# Patient Record
Sex: FEMALE | Race: BLACK OR AFRICAN AMERICAN | NOT HISPANIC OR LATINO | Employment: OTHER | URBAN - METROPOLITAN AREA
[De-identification: names, ages, dates, MRNs, and addresses within clinical notes are randomized per-mention and may not be internally consistent; named-entity substitution may affect disease eponyms.]

---

## 2018-04-23 ENCOUNTER — OFFICE VISIT (OUTPATIENT)
Dept: NEUROSURGERY | Facility: CLINIC | Age: 53
End: 2018-04-23
Payer: MEDICARE

## 2018-04-23 VITALS — WEIGHT: 285.69 LBS

## 2018-04-23 DIAGNOSIS — M47.816 FACET ARTHROPATHY, LUMBAR: Primary | ICD-10-CM

## 2018-04-23 DIAGNOSIS — M54.9 BACK PAIN, UNSPECIFIED BACK LOCATION, UNSPECIFIED BACK PAIN LATERALITY, UNSPECIFIED CHRONICITY: ICD-10-CM

## 2018-04-23 DIAGNOSIS — Z71.3 WEIGHT LOSS COUNSELING, ENCOUNTER FOR: ICD-10-CM

## 2018-04-23 PROCEDURE — 99204 OFFICE O/P NEW MOD 45 MIN: CPT | Mod: S$PBB,,, | Performed by: PHYSICIAN ASSISTANT

## 2018-04-23 PROCEDURE — 99203 OFFICE O/P NEW LOW 30 MIN: CPT | Mod: PBBFAC | Performed by: PHYSICIAN ASSISTANT

## 2018-04-23 PROCEDURE — 99999 PR PBB SHADOW E&M-NEW PATIENT-LVL III: CPT | Mod: PBBFAC,,, | Performed by: PHYSICIAN ASSISTANT

## 2018-04-23 RX ORDER — GABAPENTIN 600 MG/1
TABLET ORAL
COMMUNITY
Start: 2018-04-11

## 2018-04-23 RX ORDER — ESOMEPRAZOLE MAGNESIUM 40 MG/1
CAPSULE, DELAYED RELEASE ORAL
COMMUNITY
Start: 2018-01-19

## 2018-04-23 RX ORDER — METHYLPREDNISOLONE 4 MG/1
TABLET ORAL
Qty: 1 PACKAGE | Refills: 0 | Status: SHIPPED | OUTPATIENT
Start: 2018-04-23 | End: 2018-05-14

## 2018-04-23 RX ORDER — CITALOPRAM 40 MG/1
TABLET, FILM COATED ORAL
COMMUNITY
Start: 2018-04-09

## 2018-04-23 RX ORDER — DICLOFENAC SODIUM 75 MG/1
75 TABLET, DELAYED RELEASE ORAL 2 TIMES DAILY
COMMUNITY
End: 2018-04-23 | Stop reason: SDUPTHER

## 2018-04-23 RX ORDER — ATENOLOL 100 MG/1
TABLET ORAL
COMMUNITY
Start: 2018-04-09

## 2018-04-23 RX ORDER — TIZANIDINE 4 MG/1
TABLET ORAL
COMMUNITY
Start: 2018-04-11

## 2018-04-23 RX ORDER — LOSARTAN POTASSIUM AND HYDROCHLOROTHIAZIDE 25; 100 MG/1; MG/1
TABLET ORAL
COMMUNITY
Start: 2018-04-11

## 2018-04-23 RX ORDER — DICLOFENAC SODIUM 75 MG/1
TABLET, DELAYED RELEASE ORAL
COMMUNITY
Start: 2018-04-11

## 2018-04-23 RX ORDER — DIAZEPAM 10 MG/1
TABLET ORAL
COMMUNITY
Start: 2018-04-12

## 2018-04-23 NOTE — LETTER
April 25, 2018      Sherrie Payton MD  [address]  [address]  [facility]  [facility] [address]           [facility] - Neurosurgery 7th Fl  [address]  [facility] [address]  Phone: [phone]          Patient: Adriana Marcial   MR Number: [id number]   YOB: 1965   Date of Visit: 4/23/2018       Dear Dr. Sherrie Payton:    Thank you for referring Adriana Marcial to me for evaluation. Attached you will find relevant portions of my assessment and plan of care.    If you have questions, please do not hesitate to call me. I look forward to following Adriana Marcial along with you.    Sincerely,    Leigha Mike PA-C    Enclosure  CC:  No Recipients    If you would like to receive this communication electronically, please contact [email or web address] or [phone] to request more information on [facility] access.    For providers and/or their staff who would like to refer a patient to [facility], please contact us through our one-stop-shop provider referral line, [facility], at [phone].    If you feel you have received this communication in error or would no longer like to receive these types of communications, please e-mail [email or web address]

## 2018-04-24 NOTE — PROGRESS NOTES
[facility]  Neurosurgery  History & Physical    Patient Name: Adriana Marcial  MRN: [id number]  Primary Care Provider: Sherrie Payton MD    Patient information was obtained from patient.     Subjective:     Chief Complaint: back pain    History of Present Illness: Pt is 53 y/o female who presents with low back pain. Pain has been ongoing for over a year, but has worsened over past couple months. Pain is worse with standing and relieved with sitting. Pt used to be able to stand for about 40 min, but now can only tolerate about 5 min before needing to sit down. She states pain radiates into lateral aspect of her left leg down into calve. No pain or paresthesias in feet. She denies b/b dysfunction, weakness, or saddle anesthesia. She tried physical therapy in January and February with no relief.  She has tried Gabapentin 800 bid in the past with no relief, she is no longer taking. Currently she is taking Zanaflex, Diclofenac, and Diazepam prn for pain and muscle spasms. She gets mild relief with this regimen. She states she had injections in February with Dr. Payton, but is unaware of what kind or what location injections were given.      Review of patient's allergies indicates:  No Known Allergies    No past medical history on file.  No past surgical history on file.  Family History     None        Social History Main Topics    Smoking status: Not on file    Smokeless tobacco: Not on file    Alcohol use Not on file    Drug use: Unknown    Sexual activity: Not on file     Review of Systems   Constitutional: no fever, chills or night sweats. No changes in weight   Eyes: no visual changes   ENT: no nasal congestion or sore throat   Respiratory: no cough or shortness of breath   Cardiovascular: no chest pain or palpitations   Gastrointestinal: no nausea or vomiting   Genitourinary: no hematuria or dysuria. No urinary incontinence or retention.  Integument/Breast: no rash or pruritis    Hematologic/Lymphatic: no easy bruising or lymphadenopathy   Musculoskeletal: no arthralgias. Positive for back pain.   Neurological: no seizures or tremors   Behavioral/Psych: no auditory or visual hallucinations   Endocrine: no heat or cold intolerance     Objective:     Weight: 129.6 kg (285 lb 11.2 oz)  There is no height or weight on file to calculate BMI.  Vital Signs (Most Recent):    Vital Signs (24h Range):  [unfilled]            Neurosurgery Physical Exam  General: well developed, well nourished, no distress. Pt morbidly obese.  Head: normocephalic, atraumatic  Neurologic: Alert and oriented. Thought content appropriate.  GCS: Motor: 6/Verbal: 5/Eyes: 4 GCS Total: 15  Mental Status: Awake, Alert, Oriented x 4  Language: No aphasia  Speech: No dysarthria  Cranial nerves: face symmetric, tongue midline, CN II-XII grossly intact.   Eyes: pupils equal, round, reactive to light with accomodation, EOMI.   Pulmonary: normal respirations, no signs of respiratory distress  Abdomen: soft, non-distended, not tender to palpation  Sensory: intact to light touch throughout    Motor Strength:Moves all extremities spontaneously with good tone.  Strength exam limited 2/2 pain. No abnormal movements seen.     Strength  Deltoids Triceps Biceps Wrist Extension Wrist Flexion Hand    Upper: R 5/5 5/5 5/5 5/5 5/5 5/5    L 5/5 5/5 5/5 5/5 5/5 5/5     Iliopsoas Quadriceps Knee  Flexion Tibialis  anterior Gastro- cnemius EHL   Lower: R 3/5 5/5 5/5 5/5 5/5 5/5    L 3/5 5/5 5/5 5/5 5/5 5/5     DTR's - 2 + and symmetric in UE and LE  Pronator Drift: no drift noted  Finger-to-nose: Intact bilaterally  Holman: absent  Clonus: absent  Pulses: 2+ and symmetric radial and dorsalis pedis.  Skin: Skin is warm, dry and intact.  Straight leg raise: positive on left  Gait: normal  Tandem Gait: No difficulty         Able to walk on heels & toes  Lumbar ROM: flexion and extension limited 2/2 pain  SI Joint tenderness: Negative     Greater  trochanter TTP: Negative.  No midline ttp lumbar spine. No bony deformities or step offs. Positive left paraspinal ttp of lumbar spine.      Significant Labs:  Microbiology Results (last 7 days)     ** No results found for the last 168 hours. **            Significant Diagnostics:  Outside MRI Lumbar Spine 12/27/17: Mild L3-4 disc narrowing, the other lumbar discs are normal height. Bilateral L5 sacralization. The vertebral bodies are normal size shape and alignment. No disc herniation. Moderate L3-4 and severe L4-5 facet arthropathy. No spinal stenosis or neural foraminal narrowing.     Assessment/Plan:     Pt is 53 y/o female who presents with low back and left leg pain. Pt denies weakness, gait instability, or b/b incontinence. MRI lumbar spine shows L4-5 facet arthropathy with no evidence of canal or foraminal stenosis. Pt is unsure of previous injections, so will refer her back to Dr. Payton (per pt's request) for L4-5 facet injections. Pt may continue current medications for pain relief. Muscle spasms also likely to be causing some of back pain. Discussed with pt the importance of losing weight to relieve muscle tightness and joint distress. Will prescribe Medrol dose pack for relief. Also will refer pt for aqua therapy, which should benefit her more than regular PT. Pt dicussed with Dr. Bar.  We will have pt return to clinic in 3 months to assess progress.  Plan was discussed with pt and her family. Pt was encouraged to call our clinic with any questions, concerns, or new/worsening symptoms.     Leigha Mike PA-C  Neurosurgery  [facility] - [facility]surgery 7th Fl

## 2018-06-22 ENCOUNTER — TELEPHONE (OUTPATIENT)
Dept: NEUROSURGERY | Facility: CLINIC | Age: 53
End: 2018-06-22

## 2018-06-22 DIAGNOSIS — M47.816 FACET ARTHROPATHY, LUMBAR: Primary | ICD-10-CM

## 2018-06-22 NOTE — TELEPHONE ENCOUNTER
Spoke with pt. Told her PT had the corrected order now and will be calling her. Pt v/u    ----- Message from Kaitlynn Boggs sent at 6/22/2018 11:15 AM CDT -----  Contact: self  Pt stated that she was supposed to be contacted to schedule aquatic therapy.  She was never contacted.  Pt would like to discuss this with the nurse asap.      Pt can be reached at [phone]

## 2018-07-17 ENCOUNTER — CLINICAL SUPPORT (OUTPATIENT)
Dept: REHABILITATION | Facility: HOSPITAL | Age: 53
End: 2018-07-17
Payer: MEDICARE

## 2018-07-17 DIAGNOSIS — M54.5 CHRONIC LOW BACK PAIN, UNSPECIFIED BACK PAIN LATERALITY, WITH SCIATICA PRESENCE UNSPECIFIED: ICD-10-CM

## 2018-07-17 DIAGNOSIS — G89.29 CHRONIC LOW BACK PAIN, UNSPECIFIED BACK PAIN LATERALITY, WITH SCIATICA PRESENCE UNSPECIFIED: ICD-10-CM

## 2018-07-17 PROBLEM — M54.50 LOW BACK PAIN: Status: ACTIVE | Noted: 2018-07-17

## 2018-07-17 PROCEDURE — 97161 PT EVAL LOW COMPLEX 20 MIN: CPT

## 2018-07-17 PROCEDURE — G8979 MOBILITY GOAL STATUS: HCPCS | Mod: CK

## 2018-07-17 PROCEDURE — G8978 MOBILITY CURRENT STATUS: HCPCS | Mod: CL
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
## 2018-07-31 ENCOUNTER — CLINICAL SUPPORT (OUTPATIENT)
Dept: REHABILITATION | Facility: HOSPITAL | Age: 53
End: 2018-07-31
Payer: MEDICARE

## 2018-07-31 DIAGNOSIS — M54.5 CHRONIC LOW BACK PAIN, UNSPECIFIED BACK PAIN LATERALITY, WITH SCIATICA PRESENCE UNSPECIFIED: ICD-10-CM

## 2018-07-31 DIAGNOSIS — G89.29 CHRONIC LOW BACK PAIN, UNSPECIFIED BACK PAIN LATERALITY, WITH SCIATICA PRESENCE UNSPECIFIED: ICD-10-CM

## 2018-07-31 PROCEDURE — 97110 THERAPEUTIC EXERCISES: CPT

## 2018-07-31 NOTE — PROGRESS NOTES
"                          Physical Therapy Daily Treatment Note     Name: Adriana Bedolla Douglas  Clinic Number: [id number]    Therapy Diagnosis:   Encounter Diagnosis   Name Primary?    Chronic low back pain, unspecified back pain laterality, with sciatica presence unspecified      Physician: Leigha Mike PA-C    Visit Date: 7/31/2018  Referring Physician: Leigha Mike PA-C  Treatment Orders: Aquatic Therapy     Evaluation Date: 7/17/18  Visit # authorized: 2/20  Authorization period: 12/31/18  Plan of care Expiration: 9/25/18    Time in : 1000am  Time out: 1100am   Total time: 60 min      Subjective      Pt reports:fair progress with HEP.   she was compliant with home exercise program given last session.   Response to previous treatment:soreness  Functional change: no obvious change    Pain: 6/10, 4/10 post treatment  Location: lumbar back      Objective     Adriana received therapeutic exercises to develop strength, endurance, ROM, flexibility, posture and core stabilization for 50 minutes including:  Stat bike x10 min  Seated ball roll out x10  Prone press ups x10  LTR x2 min with ball  Modified piriformis stretch x 1 min L   Seated trunk rotations 2x 10  Seated PPT x2 min  HSS x1 min B    Adriana received the following manual therapy techniques: Joint mobilizations were applied to the: lumbar spine for 5 minutes, including:  Lumbar rotation grade I and II    Home Exercises Provided and Patient Education Provided     Education provided:   - via HEP to GO    Written Home Exercises Provided: Patient instructed to cont prior HEP.  Exercises were reviewed and Adriana was able to demonstrate them prior to the end of the session.  Adriana demonstrated good  understanding of the education provided.     See EMR under Media for exercises provided {Blank single:47263::"7/31/2018","prior visit"    Assessment     Patient responds best to prone trunk extensions which decreased her pain to 4/10 from 6/10. Patient " unable to tolerate piriformis test despite that muscle appearing to be tense. Will progress with core stabilization to improve functional mobility.  Adriana is progressing well towards her goals.   Pt prognosis is Good.     Pt will continue to benefit from skilled outpatient physical therapy to address the deficits listed in the problem list box on initial evaluation, provide pt/family education and to maximize pt's level of independence in the home and community environment.     Pt's spiritual, cultural and educational needs considered and pt agreeable to plan of care and goals.    Anticipated barriers to physical therapy: none    Goals:   Short Term Goals (4 Weeks):  - Pt will increase ROM to 110 deg trunk flexion and 15 deg extension  - Pt will increase strength to 3+/5 B hip abductors  - Decrease Pain to 4/10 as worst  - Pt to self correct posture and gait with minimal cues for core activation  - Pt independent with HEP with progressions.      Long Term Goals (10 Weeks):  - Pt will increase ROM to WNL painfree  - Pt will increase strength to 4/5 B hip abd  - Decrease Pain to 3/10 as worst  - Pt to return to 50% limitation       Plan     Continue POC per patient tolerance working on ROM and core strengthen.     Zahra Carson, PT, DPT